# Patient Record
Sex: MALE | Race: BLACK OR AFRICAN AMERICAN | NOT HISPANIC OR LATINO | Employment: OTHER | URBAN - METROPOLITAN AREA
[De-identification: names, ages, dates, MRNs, and addresses within clinical notes are randomized per-mention and may not be internally consistent; named-entity substitution may affect disease eponyms.]

---

## 2019-04-23 ENCOUNTER — OFFICE VISIT (OUTPATIENT)
Dept: PODIATRY | Facility: CLINIC | Age: 74
End: 2019-04-23
Payer: MEDICARE

## 2019-04-23 VITALS
BODY MASS INDEX: 31.15 KG/M2 | DIASTOLIC BLOOD PRESSURE: 104 MMHG | HEIGHT: 72 IN | HEART RATE: 68 BPM | WEIGHT: 230 LBS | SYSTOLIC BLOOD PRESSURE: 168 MMHG

## 2019-04-23 DIAGNOSIS — D36.10 NEUROMA: ICD-10-CM

## 2019-04-23 DIAGNOSIS — M72.2 PLANTAR FASCIITIS: Primary | ICD-10-CM

## 2019-04-23 PROCEDURE — 99213 OFFICE O/P EST LOW 20 MIN: CPT | Mod: S$PBB,,, | Performed by: PODIATRIST

## 2019-04-23 PROCEDURE — 99999 PR PBB SHADOW E&M-NEW PATIENT-LVL III: ICD-10-PCS | Mod: PBBFAC,,, | Performed by: PODIATRIST

## 2019-04-23 PROCEDURE — 99213 PR OFFICE/OUTPT VISIT, EST, LEVL III, 20-29 MIN: ICD-10-PCS | Mod: S$PBB,,, | Performed by: PODIATRIST

## 2019-04-23 PROCEDURE — 99999 PR PBB SHADOW E&M-NEW PATIENT-LVL III: CPT | Mod: PBBFAC,,, | Performed by: PODIATRIST

## 2019-04-23 PROCEDURE — 99203 OFFICE O/P NEW LOW 30 MIN: CPT | Mod: PBBFAC,PN | Performed by: PODIATRIST

## 2019-04-23 RX ORDER — ASPIRIN 81 MG/1
TABLET ORAL
COMMUNITY

## 2019-04-23 RX ORDER — SIMVASTATIN 20 MG/1
20 TABLET, FILM COATED ORAL NIGHTLY
COMMUNITY

## 2019-04-27 PROBLEM — D36.10 NEUROMA: Status: ACTIVE | Noted: 2019-04-27

## 2019-04-27 PROBLEM — M72.2 PLANTAR FASCIITIS: Status: ACTIVE | Noted: 2019-04-27

## 2019-04-27 NOTE — PROGRESS NOTES
Subjective:       Patient ID: Arsen Plata Jr. is a 73 y.o. male.    Chief Complaint: Foot Pain (bilateral)   Patient presents today as an established patient I previously treated him for plantar fasciitis bilateral about 2 and half years ago. Patient relates pain in both feet today.  Patient states that his feet actually her worse after he has been wearing tennis shoes for an extended period of time his left foot is worse than the right foot and he relates that he has a lot of pain at night.  HPI  Review of Systems   All other systems reviewed and are negative.      Objective:      Physical Exam   Constitutional: He appears well-developed and well-nourished.   Cardiovascular:   Pulses:       Dorsalis pedis pulses are 1+ on the right side, and 1+ on the left side.        Posterior tibial pulses are 1+ on the right side, and 1+ on the left side.   Pulmonary/Chest: Effort normal.   Musculoskeletal: Normal range of motion. He exhibits tenderness.   Feet:   Right Foot:   Protective Sensation: 4 sites tested. 4 sites sensed.   Skin Integrity: Positive for erythema and warmth.   Left Foot:   Protective Sensation: 4 sites tested. 4 sites sensed.   Skin Integrity: Positive for erythema and warmth.   Neurological: He is alert.   Skin: Skin is warm. Capillary refill takes 2 to 3 seconds.   Psychiatric: He has a normal mood and affect. His behavior is normal. Judgment and thought content normal.   Nursing note and vitals reviewed.      Assessment:       1. Plantar fasciitis    2. Neuroma        Plan:       Patient has findings of plantar fasciitis bilateral he was previously treated for plantar fasciitis almost 3 years ago he is also having some neuroma pain forefoot bilateral I have advised the patient he is not getting appropriate support which is contributing to both of these problems.  I have added additional blue padding as well as a metatarsal pad to the patient's power step arch supports advising him that should help to  relieve the discomfort that he is having by having this additional support if he is not doing better over the next few weeks to contact me for follow-up further evaluation and treatment we may have to look at some other treatment options.  Patient was in understanding and agreement with this.

## 2019-06-05 ENCOUNTER — TELEPHONE (OUTPATIENT)
Dept: PODIATRY | Facility: CLINIC | Age: 74
End: 2019-06-05

## 2019-06-05 NOTE — TELEPHONE ENCOUNTER
Advised pt he could purchase power steps in office bring old ones with him to see what extra padding was added and they could be added to the new ones while pt waits in the office. Pt verbalized understanding

## 2019-06-05 NOTE — TELEPHONE ENCOUNTER
----- Message from Mikey Barth sent at 6/5/2019 11:02 AM CDT -----  Type:  Patient Call Back    Who Called:  pt  Does the patient know what this is regarding?: needs inserts/cushion info   Best Call Back Number:  [phone]  Additional Information:  Please call pt and leave a detailed message if there is no answer.

## 2020-12-29 ENCOUNTER — TELEPHONE (OUTPATIENT)
Dept: PODIATRY | Facility: CLINIC | Age: 75
End: 2020-12-29

## 2020-12-29 NOTE — TELEPHONE ENCOUNTER
----- Message from Dave Villa sent at 12/29/2020 10:01 AM CST -----  Type: Needs Medical Advice  Who Called:  patient  Symptoms (please be specific):    How long has patient had these symptoms:    Pharmacy name and phone #:    Best Call Back Number: [phone]  Additional Information: requesting a call back want to know if he can come by to  a pair of shoe inserts

## 2020-12-29 NOTE — TELEPHONE ENCOUNTER
Advised patient he can come by the [facility] office next week Monday or Wednesday and to bring old ones with him to have the extra padding added. Patient verbalized understanding

## 2021-01-06 ENCOUNTER — OFFICE VISIT (OUTPATIENT)
Dept: PODIATRY | Facility: CLINIC | Age: 76
End: 2021-01-06
Payer: MEDICARE

## 2021-01-06 VITALS
DIASTOLIC BLOOD PRESSURE: 97 MMHG | WEIGHT: 203 LBS | HEIGHT: 72 IN | HEART RATE: 81 BPM | TEMPERATURE: 98 F | SYSTOLIC BLOOD PRESSURE: 151 MMHG | BODY MASS INDEX: 27.5 KG/M2

## 2021-01-06 DIAGNOSIS — L60.0 INGROWN NAIL: Primary | ICD-10-CM

## 2021-01-06 DIAGNOSIS — L03.032 PARONYCHIA OF GREAT TOE, LEFT: ICD-10-CM

## 2021-01-06 PROCEDURE — 99999 PR PBB SHADOW E&M-EST. PATIENT-LVL III: CPT | Mod: PBBFAC,,, | Performed by: PODIATRIST

## 2021-01-06 PROCEDURE — 99213 PR OFFICE/OUTPT VISIT, EST, LEVL III, 20-29 MIN: ICD-10-PCS | Mod: S$PBB,,, | Performed by: PODIATRIST

## 2021-01-06 PROCEDURE — 99213 OFFICE O/P EST LOW 20 MIN: CPT | Mod: S$PBB,,, | Performed by: PODIATRIST

## 2021-01-06 PROCEDURE — 99213 OFFICE O/P EST LOW 20 MIN: CPT | Mod: PBBFAC | Performed by: PODIATRIST

## 2021-01-06 PROCEDURE — 99999 PR PBB SHADOW E&M-EST. PATIENT-LVL III: ICD-10-PCS | Mod: PBBFAC,,, | Performed by: PODIATRIST

## 2021-01-06 RX ORDER — PANTOPRAZOLE SODIUM 20 MG/1
20 TABLET, DELAYED RELEASE ORAL DAILY
COMMUNITY

## 2021-01-09 PROBLEM — L03.032 PARONYCHIA OF GREAT TOE, LEFT: Status: ACTIVE | Noted: 2021-01-09

## 2021-01-09 PROBLEM — L60.0 INGROWN NAIL: Status: ACTIVE | Noted: 2021-01-09

## 2021-01-13 ENCOUNTER — IMMUNIZATION (OUTPATIENT)
Dept: FAMILY MEDICINE | Facility: CLINIC | Age: 76
End: 2021-01-13
Payer: MEDICARE

## 2021-01-13 DIAGNOSIS — Z23 NEED FOR VACCINATION: ICD-10-CM

## 2021-01-13 PROCEDURE — 91301 COVID-19, MRNA, LNP-S, PF, 100 MCG/0.5 ML DOSE VACCINE: CPT | Mod: ,,, | Performed by: FAMILY MEDICINE

## 2021-01-13 PROCEDURE — 0011A COVID-19, MRNA, LNP-S, PF, 100 MCG/0.5 ML DOSE VACCINE: CPT | Mod: ,,, | Performed by: FAMILY MEDICINE

## 2021-01-13 PROCEDURE — 91301 COVID-19, MRNA, LNP-S, PF, 100 MCG/0.5 ML DOSE VACCINE: ICD-10-PCS | Mod: ,,, | Performed by: FAMILY MEDICINE

## 2021-01-13 PROCEDURE — 0011A COVID-19, MRNA, LNP-S, PF, 100 MCG/0.5 ML DOSE VACCINE: ICD-10-PCS | Mod: ,,, | Performed by: FAMILY MEDICINE

## 2021-02-10 ENCOUNTER — IMMUNIZATION (OUTPATIENT)
Dept: FAMILY MEDICINE | Facility: CLINIC | Age: 76
End: 2021-02-10
Payer: MEDICARE

## 2021-02-10 DIAGNOSIS — Z23 NEED FOR VACCINATION: Primary | ICD-10-CM

## 2021-02-10 PROCEDURE — 91301 COVID-19, MRNA, LNP-S, PF, 100 MCG/0.5 ML DOSE VACCINE: CPT | Mod: S$GLB,,, | Performed by: FAMILY MEDICINE

## 2021-02-10 PROCEDURE — 0012A COVID-19, MRNA, LNP-S, PF, 100 MCG/0.5 ML DOSE VACCINE: ICD-10-PCS | Mod: CV19,S$GLB,, | Performed by: FAMILY MEDICINE

## 2021-02-10 PROCEDURE — 0012A COVID-19, MRNA, LNP-S, PF, 100 MCG/0.5 ML DOSE VACCINE: CPT | Mod: CV19,S$GLB,, | Performed by: FAMILY MEDICINE

## 2021-02-10 PROCEDURE — 91301 COVID-19, MRNA, LNP-S, PF, 100 MCG/0.5 ML DOSE VACCINE: ICD-10-PCS | Mod: S$GLB,,, | Performed by: FAMILY MEDICINE

## 2021-09-02 ENCOUNTER — TELEPHONE (OUTPATIENT)
Dept: PODIATRY | Facility: CLINIC | Age: 76
End: 2021-09-02

## 2021-09-13 ENCOUNTER — OFFICE VISIT (OUTPATIENT)
Dept: PODIATRY | Facility: CLINIC | Age: 76
End: 2021-09-13
Payer: MEDICARE

## 2021-09-13 VITALS
HEART RATE: 73 BPM | DIASTOLIC BLOOD PRESSURE: 90 MMHG | BODY MASS INDEX: 28.44 KG/M2 | WEIGHT: 210 LBS | TEMPERATURE: 98 F | SYSTOLIC BLOOD PRESSURE: 140 MMHG | HEIGHT: 72 IN

## 2021-09-13 DIAGNOSIS — L03.032 PARONYCHIA OF GREAT TOE, LEFT: Primary | ICD-10-CM

## 2021-09-13 DIAGNOSIS — L60.0 INGROWN NAIL: ICD-10-CM

## 2021-09-13 PROCEDURE — 99213 PR OFFICE/OUTPT VISIT, EST, LEVL III, 20-29 MIN: ICD-10-PCS | Mod: S$PBB,,, | Performed by: PODIATRIST

## 2021-09-13 PROCEDURE — 99213 OFFICE O/P EST LOW 20 MIN: CPT | Mod: S$PBB,,, | Performed by: PODIATRIST

## 2021-09-13 PROCEDURE — 99999 PR PBB SHADOW E&M-EST. PATIENT-LVL III: CPT | Mod: PBBFAC,,, | Performed by: PODIATRIST

## 2021-09-13 PROCEDURE — 99999 PR PBB SHADOW E&M-EST. PATIENT-LVL III: ICD-10-PCS | Mod: PBBFAC,,, | Performed by: PODIATRIST

## 2021-09-13 PROCEDURE — 99213 OFFICE O/P EST LOW 20 MIN: CPT | Mod: PBBFAC | Performed by: PODIATRIST

## 2021-12-02 ENCOUNTER — IMMUNIZATION (OUTPATIENT)
Dept: FAMILY MEDICINE | Facility: CLINIC | Age: 76
End: 2021-12-02
Payer: MEDICARE

## 2021-12-02 DIAGNOSIS — Z23 NEED FOR VACCINATION: Primary | ICD-10-CM

## 2021-12-02 PROCEDURE — 0064A COVID-19, MRNA, LNP-S, PF, 100 MCG/0.25 ML DOSE VACCINE (MODERNA BOOSTER): CPT | Mod: PBBFAC

## 2022-04-07 ENCOUNTER — IMMUNIZATION (OUTPATIENT)
Dept: FAMILY MEDICINE | Facility: CLINIC | Age: 77
End: 2022-04-07
Payer: MEDICARE

## 2022-04-07 NOTE — PROGRESS NOTES
Arsen Plata Jr. arrive to clinic awake and alert.  Pt verified name and .   Allergies reviewed with patient.  Pt given Moderna Vaccine via Intramuscular Left deltoid per provider orders.    Well tolerated by patient.  denies further needs.    Patient instructed to wait 15 mins after injection.   Patient states no vaccines in the last 14 days.  Vaccine information sheet was given to patient. Patient voiced understanding.    Vidya Clarke LPN

## 2022-12-01 ENCOUNTER — PATIENT MESSAGE (OUTPATIENT)
Dept: FAMILY MEDICINE | Facility: CLINIC | Age: 77
End: 2022-12-01
Payer: MEDICARE

## 2022-12-15 ENCOUNTER — IMMUNIZATION (OUTPATIENT)
Dept: FAMILY MEDICINE | Facility: CLINIC | Age: 77
End: 2022-12-15
Payer: MEDICARE

## 2022-12-15 DIAGNOSIS — Z23 NEED FOR VACCINATION: Primary | ICD-10-CM

## 2022-12-15 PROCEDURE — 91313 COVID-19, MRNA, LNP-S, BIVALENT BOOSTER, PF, 50 MCG/0.5 ML: CPT | Mod: PBBFAC

## 2022-12-15 PROCEDURE — 0134A COVID-19, MRNA, LNP-S, BIVALENT BOOSTER, PF, 50 MCG/0.5 ML: CPT | Mod: PBBFAC

## 2024-11-22 ENCOUNTER — TELEPHONE (OUTPATIENT)
Dept: PODIATRY | Facility: CLINIC | Age: 79
End: 2024-11-22
Payer: MEDICARE

## 2024-11-22 NOTE — TELEPHONE ENCOUNTER
Patient advised he can  new pairs of powersteps. Advised to bring old pair in to see where additional padding was placed.

## 2024-11-22 NOTE — TELEPHONE ENCOUNTER
----- Message from Gregoria sent at 11/22/2024  9:05 AM CST -----  Contact: pt [phone]  Type: Needs Medical Advice  Who Called:  Pt     Best Call Back Number: [phone]      Additional Information: Pt requesting to have provider put insteps in his shoes. Pls call back and advise